# Patient Record
Sex: FEMALE | Race: WHITE | URBAN - NONMETROPOLITAN AREA
[De-identification: names, ages, dates, MRNs, and addresses within clinical notes are randomized per-mention and may not be internally consistent; named-entity substitution may affect disease eponyms.]

---

## 2023-02-06 ENCOUNTER — OFFICE VISIT (OUTPATIENT)
Dept: URBAN - NONMETROPOLITAN AREA CLINIC 4 | Facility: CLINIC | Age: 48
End: 2023-02-06

## 2023-02-28 ENCOUNTER — OFFICE VISIT (OUTPATIENT)
Dept: URBAN - NONMETROPOLITAN AREA CLINIC 4 | Facility: CLINIC | Age: 48
End: 2023-02-28
Payer: COMMERCIAL

## 2023-02-28 ENCOUNTER — LAB OUTSIDE AN ENCOUNTER (OUTPATIENT)
Dept: URBAN - NONMETROPOLITAN AREA CLINIC 4 | Facility: CLINIC | Age: 48
End: 2023-02-28

## 2023-02-28 VITALS
SYSTOLIC BLOOD PRESSURE: 121 MMHG | HEART RATE: 83 BPM | TEMPERATURE: 98.1 F | WEIGHT: 148.4 LBS | BODY MASS INDEX: 23.29 KG/M2 | HEIGHT: 67 IN | DIASTOLIC BLOOD PRESSURE: 84 MMHG

## 2023-02-28 DIAGNOSIS — K21.9 GASTROESOPHAGEAL REFLUX DISEASE, UNSPECIFIED WHETHER ESOPHAGITIS PRESENT: ICD-10-CM

## 2023-02-28 DIAGNOSIS — K58.1 IRRITABLE BOWEL SYNDROME WITH CONSTIPATION: ICD-10-CM

## 2023-02-28 DIAGNOSIS — Z12.11 SCREENING FOR COLON CANCER: ICD-10-CM

## 2023-02-28 PROBLEM — 235595009: Status: ACTIVE | Noted: 2023-02-28

## 2023-02-28 PROBLEM — 440630006: Status: ACTIVE | Noted: 2023-02-28

## 2023-02-28 PROCEDURE — 99204 OFFICE O/P NEW MOD 45 MIN: CPT | Performed by: REGISTERED NURSE

## 2023-02-28 RX ORDER — SODIUM, POTASSIUM,MAG SULFATES 17.5-3.13G
177ML SOLUTION, RECONSTITUTED, ORAL ORAL ONCE A DAY
Qty: 354 ML | Refills: 0 | OUTPATIENT
Start: 2023-02-28 | End: 2023-03-02

## 2023-02-28 NOTE — HPI-TODAY'S VISIT:
2/28/23: Pt is a 46 yo female with PMH of GERD, TACHO, HLD, IBS, iron deficiency due to menorrhagia who is self referred for evaluation of GERD and abdominal pain.   Pt has Hx of GERD and has taken Protonix 40 mg for several years which controls her symptoms. She has taken Nexium and Prevacid in the past. Last EGD several years ago by Dr. Sapp. Denies any dysphagia, odynophagia, melena.  Pt also reports chronic constipation with bloating for several years. Typically would have 1 BM per week. Has tired Miralax in past with no improvement. She tried samples on Linzess 290 mcg, but it caused diarrhea. Started having upper abdominal pain and nausea recently. Pain improved with BM.  She sarted taking a probiotic and has noticed improvement. Having BMs daily now. Reports 2 episodes of rectal bleeding earlier this year, but none since. Denies any rectal pain or unintentional weight loss. Last colonosocpy by Dr. Sapp was around 5 years ago and reportedly normal. No FHx of colon cancer. Grandfather had pancreatic cancer

## 2023-03-01 NOTE — PHYSICAL EXAM PSYCH:
Outpatient Medications Marked as Taking for the 3/1/23 encounter (Refill) with Maria Luisa Rich PA-C   Medication Sig Dispense Refill   • tiZANidine (ZANAFLEX) 2 MG tablet Take 0.5-1 tablets by mouth nightly. 90 tablet 1       Last Visit: 1/12/2023  Next Visit: 7/14/2023      Refilled per Protcol.   normal mood with appropriate affect

## 2023-03-03 PROBLEM — 305058001: Status: ACTIVE | Noted: 2023-03-03

## 2023-04-11 ENCOUNTER — WEB ENCOUNTER (OUTPATIENT)
Dept: URBAN - METROPOLITAN AREA SURGERY CENTER 14 | Facility: SURGERY CENTER | Age: 48
End: 2023-04-11

## 2023-04-14 ENCOUNTER — OFFICE VISIT (OUTPATIENT)
Dept: URBAN - METROPOLITAN AREA SURGERY CENTER 14 | Facility: SURGERY CENTER | Age: 48
End: 2023-04-14
Payer: COMMERCIAL

## 2023-04-14 DIAGNOSIS — Z12.11 AVERAGE RISK FOR CRC. DUE TO PT'S CO-MORBID STATE WITH END STAGE DEMENTIA, HIGH RISK FOR ANESTHESIA (PER NEUROLOGY); INABILITY TO TAKE A BOWEL PREP....WOULD NOT ADVISE ANY COLORECTAL CANCER SCREENING INCLUDING STOOL TEST FOR FECAL BLOOD.: ICD-10-CM

## 2023-04-14 PROCEDURE — G8907 PT DOC NO EVENTS ON DISCHARG: HCPCS | Performed by: INTERNAL MEDICINE

## 2023-04-14 PROCEDURE — G0121 COLON CA SCRN NOT HI RSK IND: HCPCS | Performed by: INTERNAL MEDICINE

## 2023-05-09 ENCOUNTER — OFFICE VISIT (OUTPATIENT)
Dept: URBAN - NONMETROPOLITAN AREA CLINIC 4 | Facility: CLINIC | Age: 48
End: 2023-05-09
Payer: COMMERCIAL

## 2023-05-09 ENCOUNTER — WEB ENCOUNTER (OUTPATIENT)
Dept: URBAN - NONMETROPOLITAN AREA CLINIC 4 | Facility: CLINIC | Age: 48
End: 2023-05-09

## 2023-05-09 ENCOUNTER — DASHBOARD ENCOUNTERS (OUTPATIENT)
Age: 48
End: 2023-05-09

## 2023-05-09 VITALS
TEMPERATURE: 97.7 F | BODY MASS INDEX: 23.1 KG/M2 | DIASTOLIC BLOOD PRESSURE: 83 MMHG | HEIGHT: 67 IN | SYSTOLIC BLOOD PRESSURE: 110 MMHG | WEIGHT: 147.2 LBS | HEART RATE: 99 BPM

## 2023-05-09 DIAGNOSIS — K21.9 GASTROESOPHAGEAL REFLUX DISEASE, UNSPECIFIED WHETHER ESOPHAGITIS PRESENT: ICD-10-CM

## 2023-05-09 DIAGNOSIS — K58.1 IRRITABLE BOWEL SYNDROME WITH CONSTIPATION: ICD-10-CM

## 2023-05-09 PROCEDURE — 99214 OFFICE O/P EST MOD 30 MIN: CPT | Performed by: REGISTERED NURSE

## 2023-05-09 RX ORDER — TENAPANOR HYDROCHLORIDE 53.2 MG/1
1 TABLET IMMEDIATELY BEFORE MEALS TABLET ORAL TWICE A DAY
Qty: 60 | Refills: 3 | OUTPATIENT
Start: 2023-05-09 | End: 2023-06-08

## 2023-05-09 NOTE — HPI-TODAY'S VISIT:
2/28/23: Pt is a 46 yo female with PMH of GERD, TACHO, HLD, IBS, iron deficiency due to menorrhagia who is self referred for evaluation of GERD and abdominal pain.   Pt has Hx of GERD and has taken Protonix 40 mg for several years which controls her symptoms. She has taken Nexium and Prevacid in the past. Last EGD several years ago by Dr. Sapp. Denies any dysphagia, odynophagia, melena.  Pt also reports chronic constipation with bloating for several years. Typically would have 1 BM per week. Has tired Miralax in past with no improvement. She tried samples on Linzess 290 mcg, but it caused diarrhea. Started having upper abdominal pain and nausea recently. Pain improved with BM.  She sarted taking a probiotic and has noticed improvement. Having BMs daily now. Reports 2 episodes of rectal bleeding earlier this year, but none since. Denies any rectal pain or unintentional weight loss. Last colonosocpy by Dr. Sapp was around 5 years ago and reportedly normal. No FHx of colon cancer. Grandfather had pancreatic cancer.  5/9/23: Pt RTC for f/u. Had cscope 4/14/23: - The entire examined colon is normal. - The examined portion of the ileum was normal. - No specimens collected.  She continues to c/o constipation, bloating, gas and abdominal pain. Will go several days without a BM. Not currently taking anything.

## 2023-05-25 ENCOUNTER — TELEPHONE ENCOUNTER (OUTPATIENT)
Dept: URBAN - NONMETROPOLITAN AREA CLINIC 4 | Facility: CLINIC | Age: 48
End: 2023-05-25

## 2023-07-10 ENCOUNTER — OFFICE VISIT (OUTPATIENT)
Dept: URBAN - NONMETROPOLITAN AREA CLINIC 4 | Facility: CLINIC | Age: 48
End: 2023-07-10